# Patient Record
Sex: FEMALE | Race: BLACK OR AFRICAN AMERICAN
[De-identification: names, ages, dates, MRNs, and addresses within clinical notes are randomized per-mention and may not be internally consistent; named-entity substitution may affect disease eponyms.]

---

## 2018-01-15 ENCOUNTER — HOSPITAL ENCOUNTER (EMERGENCY)
Dept: HOSPITAL 72 - EMR | Age: 23
Discharge: HOME | End: 2018-01-15
Payer: MEDICAID

## 2018-01-15 VITALS — SYSTOLIC BLOOD PRESSURE: 130 MMHG | DIASTOLIC BLOOD PRESSURE: 78 MMHG

## 2018-01-15 VITALS — WEIGHT: 160 LBS | BODY MASS INDEX: 29.44 KG/M2 | HEIGHT: 62 IN

## 2018-01-15 VITALS — SYSTOLIC BLOOD PRESSURE: 138 MMHG | DIASTOLIC BLOOD PRESSURE: 76 MMHG

## 2018-01-15 DIAGNOSIS — N30.00: ICD-10-CM

## 2018-01-15 DIAGNOSIS — J45.901: Primary | ICD-10-CM

## 2018-01-15 DIAGNOSIS — J11.1: ICD-10-CM

## 2018-01-15 DIAGNOSIS — Z91.013: ICD-10-CM

## 2018-01-15 LAB
APPEARANCE UR: CLEAR
APTT PPP: (no result) S
GLUCOSE UR STRIP-MCNC: NEGATIVE MG/DL
KETONES UR QL STRIP: NEGATIVE
LEUKOCYTE ESTERASE UR QL STRIP: (no result)
NITRITE UR QL STRIP: NEGATIVE
PH UR STRIP: 8 [PH] (ref 4.5–8)
PROT UR QL STRIP: NEGATIVE
SP GR UR STRIP: 1.01 (ref 1–1.03)
UROBILINOGEN UR-MCNC: NORMAL MG/DL (ref 0–1)

## 2018-01-15 PROCEDURE — 96361 HYDRATE IV INFUSION ADD-ON: CPT

## 2018-01-15 PROCEDURE — 96365 THER/PROPH/DIAG IV INF INIT: CPT

## 2018-01-15 PROCEDURE — 99284 EMERGENCY DEPT VISIT MOD MDM: CPT

## 2018-01-15 PROCEDURE — 96375 TX/PRO/DX INJ NEW DRUG ADDON: CPT

## 2018-01-15 PROCEDURE — 87086 URINE CULTURE/COLONY COUNT: CPT

## 2018-01-15 PROCEDURE — 81025 URINE PREGNANCY TEST: CPT

## 2018-01-15 PROCEDURE — 81003 URINALYSIS AUTO W/O SCOPE: CPT

## 2018-01-15 PROCEDURE — 87181 SC STD AGAR DILUTION PER AGT: CPT

## 2018-01-15 NOTE — EMERGENCY ROOM REPORT
History of Present Illness


General


Chief Complaint:  Dyspnea/Respdistress


Source:  Patient





Present Illness


HPI


Is a 22-year-old female with history of asthma.  She gets infrequent 

exacerbation.  Last one was a year ago.  Whole family has been sick with 

flulike illness will week.  Today she has some congestion runny nose.  She woke 

up tonight with chief complaint of high fever, sore throat, coughing and 

wheezing and chest tightness.  Also with generalized body pain.  She felt very 

sick and call 911.  They gave her a breathing treatment.  She felt better from 

the wheezing 10 point.  No nausea no vomiting.  Fever of 102 upon awakening.


Allergies:  


Coded Allergies:  


     Asparagus (Verified  Allergy, Unknown, 1/15/18)


     PEACH (Verified  Allergy, Unknown, 1/15/18)


Uncoded Allergies:  


     peanut butter (Allergy, Unknown, 1/15/18)





Patient History


Past Medical History:  see triage record, old chart reviewed, asthma


Past Surgical History:  none


Pertinent Family History:  none


Social History:  Denies: smoking


Last Menstrual Period:  185/18


Pregnant Now:  No


Immunizations:  other


Reviewed Nursing Documentation:  PMH: Agreed, PSxH: Agreed





Nursing Documentation-PMH


Past Medical History:  No History, Except For


Hx Asthma:  Yes





Review of Systems


Constitutional:  Reports: fever, malaise


Eye:  Denies: eye pain, blurred vision


ENT:  Reports: nose congestion, throat pain


Respiratory:  Reports: cough, shortness of breath, wheezing


Cardiovascular:  Denies: chest pain, palpitations


Gastrointestinal:  Denies: abdominal pain, diarrhea, nausea, vomiting


Musculoskeletal:  Reports: back pain


Skin:  Denies: rash


Neurological:  Denies: headache, numbness


Endocrine:  Denies: increased thirst, increased urine


Hematologic/Lymphatic:  Denies: easy bruising


All Other Systems:  negative except mentioned in HPI





Physical Exam





Vital Signs








  Date Time  Temp Pulse Resp B/P (MAP) Pulse Ox O2 Delivery O2 Flow Rate FiO2


 


1/15/18 02:21 102.6 120 15 140/77 99 Room Air  





vitals with a fever


Sp02 EP Interpretation:  reviewed, normal


General Appearance:  well appearing, no apparent distress, alert


Head:  normocephalic, atraumatic


Eyes:  bilateral eye PERRL, bilateral eye EOMI


ENT:  hearing grossly normal, normal pharynx


Neck:  full range of motion, supple, no meningismus


Respiratory:  chest non-tender, lungs clear, normal breath sounds


Cardiovascular #1:  regular rate, rhythm, no murmur


Gastrointestinal:  normal bowel sounds, non tender, no mass, no organomegaly, 

no bruit, non-distended


Musculoskeletal:  back normal, gait/station normal, normal range of motion


Psychiatric:  mood/affect normal


Skin:  warm/dry





Medical Decision Making


Diagnostic Impression:  


 Primary Impression:  


 Influenza-like illness


 Additional Impressions:  


 UTI (urinary tract infection)


 Qualified Codes:  N30.00 - Acute cystitis without hematuria


 Asthma attack


 Qualified Codes:  J45.901 - Unspecified asthma with (acute) exacerbation


ER Course


Patient presents with influenza-like illness.  Because of her asthma, will put 

her on Tamiflu.  She is diffuse back and body pain.  No CVA tenderness.  Urine 

positive.  We'll put on antibiotics.  No evidence of sepsis, meningitis for 

pyelonephritis.





Last Vital Signs








  Date Time  Temp Pulse Resp B/P (MAP) Pulse Ox O2 Delivery O2 Flow Rate FiO2


 


1/15/18 02:21 102.6 120 15 140/77 99 Room Air  








Status:  improved


Disposition:  HOME, SELF-CARE


Condition:  Stable


Scripts


Oseltamivir Phosphate (Tamiflu) 75 Mg Capsule


75 MG ORAL TWICE A DAY, #10 CAP


   Prov: LUCHO HSU M.D.         1/15/18 


Ibuprofen* (MOTRIN*) 600 Mg Tablet


600 MG ORAL Q8H Y for For Pain, #30 TAB 0 Refills


   Prov: LUCHO HSU M.D.         1/15/18 


Cephalexin* (KEFLEX*) 500 Mg Capsule


500 MG ORAL TID, #21 CAP 0 Refills


   Prov: LUCHO HSU M.D.         1/15/18 


Albuterol Sulfate* (ALBUTEROL SULFATE MDI*) 8.5 Gm Hfa.aer.ad


2 PUFF INH Q4H Y for cough/wheezing, #1 EA 0 Refills


   Prov: LUCHO HSU M.D.         1/15/18





Additional Instructions:  


Followup with your DrNorm in 3-5 days.  Return if symptom worsen.











LUCHO HSU M.D. Heath 15, 2018 02:29